# Patient Record
Sex: FEMALE | Race: WHITE | Employment: OTHER | URBAN - METROPOLITAN AREA
[De-identification: names, ages, dates, MRNs, and addresses within clinical notes are randomized per-mention and may not be internally consistent; named-entity substitution may affect disease eponyms.]

---

## 2017-01-16 PROCEDURE — 81015 MICROSCOPIC EXAM OF URINE: CPT | Performed by: UROLOGY

## 2017-11-01 PROBLEM — K63.5 COLON POLYP: Status: ACTIVE | Noted: 2017-11-01

## 2017-12-01 PROCEDURE — 82043 UR ALBUMIN QUANTITATIVE: CPT | Performed by: INTERNAL MEDICINE

## 2017-12-01 PROCEDURE — 82570 ASSAY OF URINE CREATININE: CPT | Performed by: INTERNAL MEDICINE

## 2018-04-05 PROBLEM — E78.00 HYPERCHOLESTEREMIA: Status: ACTIVE | Noted: 2018-04-05

## 2018-07-25 PROCEDURE — 82570 ASSAY OF URINE CREATININE: CPT | Performed by: INTERNAL MEDICINE

## 2018-07-25 PROCEDURE — 82043 UR ALBUMIN QUANTITATIVE: CPT | Performed by: INTERNAL MEDICINE

## 2018-08-06 PROBLEM — H10.31 ACUTE CONJUNCTIVITIS OF RIGHT EYE, UNSPECIFIED ACUTE CONJUNCTIVITIS TYPE: Status: ACTIVE | Noted: 2018-08-06

## 2018-08-06 PROBLEM — B37.0 ORAL THRUSH: Status: ACTIVE | Noted: 2018-08-06

## 2018-08-06 PROBLEM — J20.9 BRONCHITIS WITH BRONCHOSPASM: Status: ACTIVE | Noted: 2018-08-06

## 2018-08-20 PROBLEM — H10.31 ACUTE CONJUNCTIVITIS OF RIGHT EYE, UNSPECIFIED ACUTE CONJUNCTIVITIS TYPE: Status: RESOLVED | Noted: 2018-08-06 | Resolved: 2018-08-20

## 2018-08-20 PROBLEM — B37.0 ORAL THRUSH: Status: RESOLVED | Noted: 2018-08-06 | Resolved: 2018-08-20

## 2018-08-20 PROBLEM — J20.9 BRONCHITIS WITH BRONCHOSPASM: Status: RESOLVED | Noted: 2018-08-06 | Resolved: 2018-08-20

## 2019-02-18 PROCEDURE — 86803 HEPATITIS C AB TEST: CPT | Performed by: INTERNAL MEDICINE

## 2021-05-03 PROBLEM — M25.552 LEFT HIP PAIN: Status: ACTIVE | Noted: 2021-05-03

## 2021-10-26 PROBLEM — M25.552 LEFT HIP PAIN: Status: RESOLVED | Noted: 2021-05-03 | Resolved: 2021-10-26

## 2022-01-26 PROBLEM — L25.9 CONTACT DERMATITIS, UNSPECIFIED CONTACT DERMATITIS TYPE, UNSPECIFIED TRIGGER: Status: ACTIVE | Noted: 2022-01-26

## 2022-02-25 PROBLEM — N32.81 OVERACTIVE BLADDER: Status: ACTIVE | Noted: 2022-02-25

## 2024-04-30 RX ORDER — MELOXICAM 15 MG/1
15 TABLET ORAL DAILY
COMMUNITY

## 2024-04-30 RX ORDER — TIZANIDINE 4 MG/1
4 TABLET ORAL NIGHTLY PRN
COMMUNITY

## 2024-05-25 ENCOUNTER — LAB ENCOUNTER (OUTPATIENT)
Dept: LAB | Age: 69
End: 2024-05-25
Attending: UROLOGY

## 2024-05-25 DIAGNOSIS — Z01.818 PRE-OP TESTING: ICD-10-CM

## 2024-05-25 LAB
ANION GAP SERPL CALC-SCNC: 9 MMOL/L (ref 0–18)
BUN BLD-MCNC: 17 MG/DL (ref 9–23)
CALCIUM BLD-MCNC: 9.3 MG/DL (ref 8.5–10.1)
CHLORIDE SERPL-SCNC: 104 MMOL/L (ref 98–112)
CO2 SERPL-SCNC: 25 MMOL/L (ref 21–32)
CREAT BLD-MCNC: 0.9 MG/DL
EGFRCR SERPLBLD CKD-EPI 2021: 69 ML/MIN/1.73M2 (ref 60–?)
GLUCOSE BLD-MCNC: 108 MG/DL (ref 70–99)
OSMOLALITY SERPL CALC.SUM OF ELEC: 288 MOSM/KG (ref 275–295)
POTASSIUM SERPL-SCNC: 4 MMOL/L (ref 3.5–5.1)
SODIUM SERPL-SCNC: 138 MMOL/L (ref 136–145)

## 2024-05-25 PROCEDURE — 36415 COLL VENOUS BLD VENIPUNCTURE: CPT

## 2024-05-25 PROCEDURE — 80048 BASIC METABOLIC PNL TOTAL CA: CPT

## 2024-05-28 NOTE — PAT NURSING NOTE
Called Dr. Eric's office x2 option 3, on hold over 20 minutes for each call. Need to request stress test report and tracing completed 5/24, s/w Opal SMYTH

## 2024-05-29 ENCOUNTER — ANESTHESIA EVENT (OUTPATIENT)
Dept: SURGERY | Facility: HOSPITAL | Age: 69
End: 2024-05-29
Payer: MEDICARE

## 2024-05-29 ENCOUNTER — APPOINTMENT (OUTPATIENT)
Dept: GENERAL RADIOLOGY | Facility: HOSPITAL | Age: 69
End: 2024-05-29
Attending: UROLOGY
Payer: MEDICARE

## 2024-05-29 ENCOUNTER — HOSPITAL ENCOUNTER (OUTPATIENT)
Dept: INTERVENTIONAL RADIOLOGY/VASCULAR | Facility: HOSPITAL | Age: 69
Discharge: HOME | End: 2024-05-29
Attending: UROLOGY | Admitting: UROLOGY
Payer: MEDICARE

## 2024-05-29 ENCOUNTER — ANESTHESIA (OUTPATIENT)
Dept: SURGERY | Facility: HOSPITAL | Age: 69
End: 2024-05-29
Payer: MEDICARE

## 2024-05-29 ENCOUNTER — HOSPITAL ENCOUNTER (OUTPATIENT)
Dept: INTERVENTIONAL RADIOLOGY/VASCULAR | Facility: HOSPITAL | Age: 69
Discharge: HOME | DRG: 660 | End: 2024-05-29
Attending: UROLOGY | Admitting: UROLOGY
Payer: MEDICARE

## 2024-05-29 ENCOUNTER — APPOINTMENT (OUTPATIENT)
Dept: GENERAL RADIOLOGY | Facility: HOSPITAL | Age: 69
DRG: 660 | End: 2024-05-29
Attending: UROLOGY
Payer: MEDICARE

## 2024-05-29 ENCOUNTER — HOSPITAL ENCOUNTER (INPATIENT)
Facility: HOSPITAL | Age: 69
LOS: 1 days | Discharge: HOME | DRG: 660 | End: 2024-05-29
Attending: UROLOGY | Admitting: UROLOGY
Payer: MEDICARE

## 2024-05-29 ENCOUNTER — HOSPITAL ENCOUNTER (INPATIENT)
Facility: HOSPITAL | Age: 69
LOS: 1 days | Discharge: HOME | End: 2024-05-29
Attending: UROLOGY | Admitting: UROLOGY
Payer: MEDICARE

## 2024-05-29 VITALS
RESPIRATION RATE: 27 BRPM | OXYGEN SATURATION: 98 % | WEIGHT: 254 LBS | DIASTOLIC BLOOD PRESSURE: 61 MMHG | HEART RATE: 84 BPM | BODY MASS INDEX: 45 KG/M2 | SYSTOLIC BLOOD PRESSURE: 107 MMHG | TEMPERATURE: 97 F | HEIGHT: 63 IN

## 2024-05-29 DIAGNOSIS — N20.0 STAGHORN CALCULUS: ICD-10-CM

## 2024-05-29 DIAGNOSIS — Z01.818 PRE-OP TESTING: Primary | ICD-10-CM

## 2024-05-29 LAB
GLUCOSE BLD-MCNC: 113 MG/DL (ref 70–99)
GLUCOSE BLD-MCNC: 97 MG/DL (ref 70–99)
INR BLD: 0.95 (ref 0.8–1.2)
PROTHROMBIN TIME: 12.6 SECONDS (ref 11.6–14.8)

## 2024-05-29 PROCEDURE — 0TC68ZZ EXTIRPATION OF MATTER FROM RIGHT URETER, VIA NATURAL OR ARTIFICIAL OPENING ENDOSCOPIC: ICD-10-PCS | Performed by: UROLOGY

## 2024-05-29 PROCEDURE — 82962 GLUCOSE BLOOD TEST: CPT

## 2024-05-29 PROCEDURE — BT1D1ZZ FLUOROSCOPY OF RIGHT KIDNEY, URETER AND BLADDER USING LOW OSMOLAR CONTRAST: ICD-10-PCS | Performed by: UROLOGY

## 2024-05-29 PROCEDURE — 99153 MOD SED SAME PHYS/QHP EA: CPT | Performed by: RADIOLOGY

## 2024-05-29 PROCEDURE — 76000 FLUOROSCOPY <1 HR PHYS/QHP: CPT | Performed by: UROLOGY

## 2024-05-29 PROCEDURE — 76705 ECHO EXAM OF ABDOMEN: CPT | Performed by: RADIOLOGY

## 2024-05-29 PROCEDURE — 0T768DZ DILATION OF RIGHT URETER WITH INTRALUMINAL DEVICE, VIA NATURAL OR ARTIFICIAL OPENING ENDOSCOPIC: ICD-10-PCS | Performed by: UROLOGY

## 2024-05-29 PROCEDURE — 82365 CALCULUS SPECTROSCOPY: CPT | Performed by: UROLOGY

## 2024-05-29 PROCEDURE — 85610 PROTHROMBIN TIME: CPT

## 2024-05-29 PROCEDURE — 99152 MOD SED SAME PHYS/QHP 5/>YRS: CPT | Performed by: RADIOLOGY

## 2024-05-29 PROCEDURE — 88300 SURGICAL PATH GROSS: CPT | Performed by: UROLOGY

## 2024-05-29 PROCEDURE — 0TJ53ZZ INSPECTION OF KIDNEY, PERCUTANEOUS APPROACH: ICD-10-PCS | Performed by: RADIOLOGY

## 2024-05-29 DEVICE — URETERAL STENT
Type: IMPLANTABLE DEVICE | Site: URETER | Status: FUNCTIONAL
Brand: ASCERTA™

## 2024-05-29 RX ORDER — ONDANSETRON 2 MG/ML
4 INJECTION INTRAMUSCULAR; INTRAVENOUS EVERY 6 HOURS PRN
Status: DISCONTINUED | OUTPATIENT
Start: 2024-05-29 | End: 2024-05-29

## 2024-05-29 RX ORDER — METOCLOPRAMIDE HYDROCHLORIDE 5 MG/ML
10 INJECTION INTRAMUSCULAR; INTRAVENOUS EVERY 8 HOURS PRN
Status: DISCONTINUED | OUTPATIENT
Start: 2024-05-29 | End: 2024-05-29

## 2024-05-29 RX ORDER — ACETAMINOPHEN 500 MG
1000 TABLET ORAL ONCE AS NEEDED
Status: COMPLETED | OUTPATIENT
Start: 2024-05-29 | End: 2024-05-29

## 2024-05-29 RX ORDER — INSULIN ASPART 100 [IU]/ML
INJECTION, SOLUTION INTRAVENOUS; SUBCUTANEOUS ONCE
Status: DISCONTINUED | OUTPATIENT
Start: 2024-05-29 | End: 2024-05-29

## 2024-05-29 RX ORDER — NALOXONE HYDROCHLORIDE 0.4 MG/ML
0.08 INJECTION, SOLUTION INTRAMUSCULAR; INTRAVENOUS; SUBCUTANEOUS AS NEEDED
Status: DISCONTINUED | OUTPATIENT
Start: 2024-05-29 | End: 2024-05-29

## 2024-05-29 RX ORDER — SODIUM CHLORIDE, SODIUM LACTATE, POTASSIUM CHLORIDE, CALCIUM CHLORIDE 600; 310; 30; 20 MG/100ML; MG/100ML; MG/100ML; MG/100ML
INJECTION, SOLUTION INTRAVENOUS CONTINUOUS
Status: DISCONTINUED | OUTPATIENT
Start: 2024-05-29 | End: 2024-05-29

## 2024-05-29 RX ORDER — HYDROCODONE BITARTRATE AND ACETAMINOPHEN 5; 325 MG/1; MG/1
2 TABLET ORAL ONCE AS NEEDED
Status: COMPLETED | OUTPATIENT
Start: 2024-05-29 | End: 2024-05-29

## 2024-05-29 RX ORDER — MIDAZOLAM HYDROCHLORIDE 1 MG/ML
INJECTION INTRAMUSCULAR; INTRAVENOUS
Status: COMPLETED
Start: 2024-05-29 | End: 2024-05-29

## 2024-05-29 RX ORDER — HYDROCODONE BITARTRATE AND ACETAMINOPHEN 5; 325 MG/1; MG/1
1 TABLET ORAL ONCE AS NEEDED
Status: COMPLETED | OUTPATIENT
Start: 2024-05-29 | End: 2024-05-29

## 2024-05-29 RX ORDER — LEVOFLOXACIN 5 MG/ML
INJECTION, SOLUTION INTRAVENOUS
Status: COMPLETED
Start: 2024-05-29 | End: 2024-05-29

## 2024-05-29 RX ORDER — ROCURONIUM BROMIDE 10 MG/ML
INJECTION, SOLUTION INTRAVENOUS AS NEEDED
Status: DISCONTINUED | OUTPATIENT
Start: 2024-05-29 | End: 2024-05-29 | Stop reason: SURG

## 2024-05-29 RX ORDER — HYDROMORPHONE HYDROCHLORIDE 1 MG/ML
0.4 INJECTION, SOLUTION INTRAMUSCULAR; INTRAVENOUS; SUBCUTANEOUS EVERY 5 MIN PRN
Status: DISCONTINUED | OUTPATIENT
Start: 2024-05-29 | End: 2024-05-29

## 2024-05-29 RX ORDER — KETOROLAC TROMETHAMINE 30 MG/ML
INJECTION, SOLUTION INTRAMUSCULAR; INTRAVENOUS AS NEEDED
Status: DISCONTINUED | OUTPATIENT
Start: 2024-05-29 | End: 2024-05-29 | Stop reason: SURG

## 2024-05-29 RX ORDER — LIDOCAINE HYDROCHLORIDE 10 MG/ML
INJECTION, SOLUTION EPIDURAL; INFILTRATION; INTRACAUDAL; PERINEURAL AS NEEDED
Status: DISCONTINUED | OUTPATIENT
Start: 2024-05-29 | End: 2024-05-29 | Stop reason: SURG

## 2024-05-29 RX ORDER — DEXAMETHASONE SODIUM PHOSPHATE 4 MG/ML
VIAL (ML) INJECTION AS NEEDED
Status: DISCONTINUED | OUTPATIENT
Start: 2024-05-29 | End: 2024-05-29 | Stop reason: SURG

## 2024-05-29 RX ORDER — ACETAMINOPHEN 500 MG
1000 TABLET ORAL ONCE
Status: DISCONTINUED | OUTPATIENT
Start: 2024-05-29 | End: 2024-05-29

## 2024-05-29 RX ORDER — HYDROMORPHONE HYDROCHLORIDE 1 MG/ML
0.2 INJECTION, SOLUTION INTRAMUSCULAR; INTRAVENOUS; SUBCUTANEOUS EVERY 5 MIN PRN
Status: DISCONTINUED | OUTPATIENT
Start: 2024-05-29 | End: 2024-05-29

## 2024-05-29 RX ORDER — NICOTINE POLACRILEX 4 MG
30 LOZENGE BUCCAL
Status: DISCONTINUED | OUTPATIENT
Start: 2024-05-29 | End: 2024-05-29

## 2024-05-29 RX ORDER — MIDAZOLAM HYDROCHLORIDE 1 MG/ML
1 INJECTION INTRAMUSCULAR; INTRAVENOUS EVERY 5 MIN PRN
Status: DISCONTINUED | OUTPATIENT
Start: 2024-05-29 | End: 2024-05-29

## 2024-05-29 RX ORDER — LIDOCAINE HYDROCHLORIDE 10 MG/ML
INJECTION, SOLUTION INFILTRATION; PERINEURAL
Status: COMPLETED
Start: 2024-05-29 | End: 2024-05-29

## 2024-05-29 RX ORDER — HYDROMORPHONE HYDROCHLORIDE 1 MG/ML
0.6 INJECTION, SOLUTION INTRAMUSCULAR; INTRAVENOUS; SUBCUTANEOUS EVERY 5 MIN PRN
Status: DISCONTINUED | OUTPATIENT
Start: 2024-05-29 | End: 2024-05-29

## 2024-05-29 RX ORDER — MIDAZOLAM HYDROCHLORIDE 1 MG/ML
INJECTION INTRAMUSCULAR; INTRAVENOUS
Status: DISCONTINUED
Start: 2024-05-29 | End: 2024-05-29 | Stop reason: WASHOUT

## 2024-05-29 RX ORDER — ONDANSETRON 2 MG/ML
INJECTION INTRAMUSCULAR; INTRAVENOUS AS NEEDED
Status: DISCONTINUED | OUTPATIENT
Start: 2024-05-29 | End: 2024-05-29 | Stop reason: SURG

## 2024-05-29 RX ORDER — DIPHENHYDRAMINE HYDROCHLORIDE 50 MG/ML
12.5 INJECTION INTRAMUSCULAR; INTRAVENOUS AS NEEDED
Status: DISCONTINUED | OUTPATIENT
Start: 2024-05-29 | End: 2024-05-29

## 2024-05-29 RX ORDER — HYDRALAZINE HYDROCHLORIDE 20 MG/ML
INJECTION INTRAMUSCULAR; INTRAVENOUS AS NEEDED
Status: DISCONTINUED | OUTPATIENT
Start: 2024-05-29 | End: 2024-05-29 | Stop reason: SURG

## 2024-05-29 RX ORDER — NICOTINE POLACRILEX 4 MG
15 LOZENGE BUCCAL
Status: DISCONTINUED | OUTPATIENT
Start: 2024-05-29 | End: 2024-05-29

## 2024-05-29 RX ORDER — CEPHALEXIN 500 MG/1
500 CAPSULE ORAL 3 TIMES DAILY
Qty: 6 CAPSULE | Refills: 0 | Status: SHIPPED | OUTPATIENT
Start: 2024-05-29 | End: 2024-05-31

## 2024-05-29 RX ORDER — DEXTROSE MONOHYDRATE 25 G/50ML
50 INJECTION, SOLUTION INTRAVENOUS
Status: DISCONTINUED | OUTPATIENT
Start: 2024-05-29 | End: 2024-05-29

## 2024-05-29 RX ADMIN — HYDRALAZINE HYDROCHLORIDE 10 MG: 20 INJECTION INTRAMUSCULAR; INTRAVENOUS at 16:26:00

## 2024-05-29 RX ADMIN — ROCURONIUM BROMIDE 80 MG: 10 INJECTION, SOLUTION INTRAVENOUS at 14:20:00

## 2024-05-29 RX ADMIN — DEXAMETHASONE SODIUM PHOSPHATE 4 MG: 4 MG/ML VIAL (ML) INJECTION at 14:20:00

## 2024-05-29 RX ADMIN — ONDANSETRON 4 MG: 2 INJECTION INTRAMUSCULAR; INTRAVENOUS at 16:34:00

## 2024-05-29 RX ADMIN — ROCURONIUM BROMIDE 10 MG: 10 INJECTION, SOLUTION INTRAVENOUS at 15:14:00

## 2024-05-29 RX ADMIN — LIDOCAINE HYDROCHLORIDE 50 MG: 10 INJECTION, SOLUTION EPIDURAL; INFILTRATION; INTRACAUDAL; PERINEURAL at 14:20:00

## 2024-05-29 RX ADMIN — SODIUM CHLORIDE, SODIUM LACTATE, POTASSIUM CHLORIDE, CALCIUM CHLORIDE: 600; 310; 30; 20 INJECTION, SOLUTION INTRAVENOUS at 14:20:00

## 2024-05-29 RX ADMIN — LEVOFLOXACIN 500 MG: 5 INJECTION, SOLUTION INTRAVENOUS at 10:42:00

## 2024-05-29 RX ADMIN — ROCURONIUM BROMIDE 10 MG: 10 INJECTION, SOLUTION INTRAVENOUS at 16:00:00

## 2024-05-29 RX ADMIN — KETOROLAC TROMETHAMINE 30 MG: 30 INJECTION, SOLUTION INTRAMUSCULAR; INTRAVENOUS at 16:24:00

## 2024-05-29 RX ADMIN — ROCURONIUM BROMIDE 10 MG: 10 INJECTION, SOLUTION INTRAVENOUS at 15:33:00

## 2024-05-29 NOTE — H&P
[facility]  H&P    Luke Giraldo Patient Status:  Inpatient    10/23/1955 MRN [id number]   Location [facility] PERIOPERATIVE SERVICE Attending Crescencio Richards MD   Hosp Day # 0 PCP Pushpa Porter MD     Impression and Plan:  Large right renal calculus.  Unable to gain access for percutaneous nephrolithotripsy.  Will therefore do ureteroscopy.  T  Crescencio Richards MD  2024  1:55 PM      History of Present Illness:  Luke Giraldo presented for right percutaneous nephrolithotripsy but interventional radiology was unable to gain access because of her body habitus.  She has a 2.6 cm right I therefore spoke t to o the patient about another future attempted percutaneous access by another physician possibly using a different technique, versus staged ureteroscopy.  We decided to do ureteroscopy today and if unable to complete the stone treatment she would return for second stage ureteroscopy sometime in the next couple of weeks.  She has not had any fevers.  She is now very comfortable.  We have discussed the various stone procedures and risks in the past.  Allergies:  Allergies   Allergen Reactions    Penicillins RASH     At age 13       Medications:    Outpatient Medications Marked as Taking for the 24 encounter (Hospital Encounter)   Medication Sig Dispense Refill    tiZANidine 4 MG Oral Tab Take 1 tablet (4 mg total) by mouth nightly as needed.      Meloxicam 15 MG Oral Tab Take 1 tablet (15 mg total) by mouth daily.      METFORMIN  MG Oral Tablet 24 Hr TAKE 1 TABLET(500 MG) BY MOUTH DAILY WITH BREAKFAST 90 tablet 0    levothyroxine 75 MCG Oral Tab Take 1 tablet (75 mcg total) by mouth every morning before breakfast. 90 tablet 1    lisinopril 2.5 MG Oral Tab Take 1 tablet (2.5 mg total) by mouth daily. 90 tablet 3    Cholecalciferol (VITAMIN D) 2000 units Oral Cap Take 1 capsule (2,000 Units total) by mouth daily.         Current Facility-Administered Medications   Medication Dose Route  Frequency    acetaminophen (Tylenol Extra Strength) tab 1,000 mg  1,000 mg Oral Once    glucose (Dex4) 15 GM/59ML oral liquid 15 g  15 g Oral Q15 Min PRN    Or    glucose (Glutose) 40% oral gel 15 g  15 g Oral Q15 Min PRN    Or    glucose-vitamin C (Dex-4) chewable tab 4 tablet  4 tablet Oral Q15 Min PRN    Or    dextrose 50% injection 50 mL  50 mL Intravenous Q15 Min PRN    Or    glucose (Dex4) 15 GM/59ML oral liquid 30 g  30 g Oral Q15 Min PRN    Or    glucose (Glutose) 40% oral gel 30 g  30 g Oral Q15 Min PRN    Or    glucose-vitamin C (Dex-4) chewable tab 8 tablet  8 tablet Oral Q15 Min PRN    lactated ringers infusion   Intravenous Continuous    ceFAZolin (Ancef) 2g in 10mL IV syringe premix  2 g Intravenous Once       History:  Past Medical History:    Abnormal electrocardiogram    Cholelithiasis    Cholelithiasis    Colon polyps    Tubular adenoma    Dermatophytosis    Diabetes (HCC)    Disorder of thyroid    Essential hypertension    Essential hypertension    Exomphalos (HCC)    Hernia of anterior abdominal wall    Herpes zoster    High blood pressure    High cholesterol    Hypothyroidism    Impaired glucose tolerance    Localized primary osteoarthritis of wrist    Lymphedema    Obesity    Obstructive sleep apnea (adult) (pediatric)    AHI 36 REM AHI 33 O2 Bharat 77%    Osteoarthritis    Rheumatoid arthritis (HCC)    at age 17 told she had it, at age 48 told that she did not have it    Urolithiasis    Visual impairment    Vitamin D deficiency       Past Surgical History:   Procedure Laterality Date          Colonoscopy  2017    Colonoscopy  2019    Rodolfo Wei    Knee replacement surgery      Other surgical history      Lap Band    Other surgical history  2020    cystoscoopy procedure Dr Gunderson     Total knee replacement Bilateral        Family History   Problem Relation Age of Onset    Diabetes Father     Heart Disorder Father     Hypertension Father     Cancer Mother          Breast and Liver    Breast Cancer Mother 80        80 metastasized passed at 91    Diabetes Maternal Grandmother     Diabetes Paternal Grandmother     Cancer Sister         Breast    Breast Cancer Sister 63        63       Social History     Socioeconomic History    Marital status:      Spouse name: Not on file    Number of children: Not on file    Years of education: Not on file    Highest education level: Not on file   Occupational History    Not on file   Tobacco Use    Smoking status: Former     Current packs/day: 0.00     Average packs/day: 1 pack/day for 10.0 years (10.0 ttl pk-yrs)     Types: Cigarettes     Start date: 10/6/1987     Quit date: 10/6/1997     Years since quittin.6    Smokeless tobacco: Never   Vaping Use    Vaping status: Never Used   Substance and Sexual Activity    Alcohol use: Yes    Drug use: No    Sexual activity: Yes   Other Topics Concern    Not on file   Social History Narrative    Not on file     Social Determinants of Health     Financial Resource Strain: Not on file   Food Insecurity: Not on file   Transportation Needs: Not on file   Physical Activity: Not on file   Stress: Not on file   Social Connections: Not on file   Housing Stability: Not on file       Review of Systems:  No SOB, angina, fevers, focal weakness, and as in HPI.    Physical Exam:  /68   Pulse (!) 47   Temp 97 °F (36.1 °C) (Temporal)   Resp 16   Ht 5' 3\" (1.6 m)   Wt 253 lb 15.5 oz (115.2 kg)   SpO2 93%   BMI 44.99 kg/m²   General: Alert, orientated x3.  Cooperative.  No apparent distress.  HEENT: Exam is unremarkable.   Lungs: Clear to auscultation bilaterally.  Cardiac: Regular rate and rhythm. No murmur.  Abdomen: Obese. Soft, non-distended, non-tender, with no rebound or guarding.  No peritoneal signs. No ascites.  Liver is within normal limits.  Spleen is not palpable.    Genitourinary: No flank tenderness.   Extremities:  No lower extremity edema noted.  Without clubbing or cyanosis.     Skin: Normal texture and turgor.  Neurologic:  Motor strength and sensory examination is grossly normal.  No focal neurologic deficit.    Laboratory Data:       Crescencio Richards M.D.  [facility], Urology   [phone]  @

## 2024-05-29 NOTE — OPERATIVE REPORT
[facility]   OPERATIVE REPORT    Luek Giraldo Patient Status:  Inpatient    10/23/1955 MRN [id number]   Location [facility] POST ANESTHESIA CARE UNIT Attending Crescencio Richards MD   Hosp Day # 0 PCP Pushpa Porter MD     DATE OF OPERATION: 2024     PREOPERATIVE DIAGNOSIS: Right Ureteral Calculus.    POSTOPERATIVE DIAGNOSIS: Same    PROCEDURE PERFORMED:  Right Ureteroscopic Stone extraction with Laser Lithotripsy, Cystoscopy, Retrograde Pyelogram and Placement of Ureteral Stent    SURGEON: Crescencio Richards MD    ANESTHESIA:  General.     INDICATIONS:  Flank pain related to ureteral calculus     FINDINGS:  Large, soft stone.  Fragmented very well.  Prolonged procedure code related to the size of the stone and location.  Procedure time was 2 hours and 5 minutes.  She will schedule an appointment in the office in 3 to 4 weeks for possible cystoscopy and stent removal but will get a CT scan a couple of days prior to that in case she might need a second stage ureteroscopy.  Cephalexin x 2 days for postop prophylaxis.     EBL: None    COMPLICATIONS: None    SPECIMENS: Stone fragments    TECHNIQUE:  A general anesthesia was induced.  A time-out was  reviewed.  Preoperative antibiotics were administered.  The patient  was prepped and draped in the dorsal lithotomy position.  Sequential  compression devices were in place on the lower legs.     The cystoscope was passed into the bladder and the bladder was  examined.  The ureteral orifices were in normal position.  The  cystoscope was used to guide a ureteral catheter into the right  ureteral orifice and water soluble contrast was injected retrograde.  I could faintly see the stone on the plain images and it was outlined by the contrast in the renal pelvis.  The orientation of the calyces suggested slightly laterally oriented lower pole and medially oriented upper pole.     The guidewire was passed and then the obturator from the ureteral sheath was then passed  over a guidewire up into the obturator and 12/14 ureteral sheath were advanced over the guidewire.  The obturator was removed.  The flexible ureteroscope was advanced up to  the level of the stone.  There was a little bit of blood in the renal pelvis related to the attempt to place the percutaneous access  Interventional radiology.  The stone was fragmented with the holmium laser 365 a long time was spent µm fiber.  The stone fragments were then removed  with a basket.  Removing fragments and irrigating out the calyces and reexamining until all fragments being removed were only 1 or 2 mm.  I irrigated out much of the [place] the fragmentation.  A  6-Arabic 24-cm  stent was then passed over a guidewire up into the ureter.  I could not get good cortical of the proximal portion of the stent and eventually removed that and used a 26 cm stent but still could not get it to coil in renal pelvis.  The tip of the stent curled around down into the lower pole calyx and I decided to leave it in that position.  The Dangler's had been removed from the stent.  There was a nice coil in the bladder.   The patient was  awakened, and taken to the recovery area in good condition.     Crescencio Richards M.D.  [facility], Urology  [phone]  5/29/2024  5:01 PM

## 2024-05-29 NOTE — PROCEDURES
[facility]   part of [facility]  Procedure Note    Luke Giraldo Patient Status:  Outpatient    10/23/1955 MRN [id number]   Location [facility] INTERVENTIONAL SUITES Attending Crescencio Richards MD    Day # 0 PCP Pushap Porter MD     Procedure: Right nephrostomy tube placement attempt    Pre-Procedure Diagnosis:  Right renal calculus    Post-Procedure Diagnosis: Same    Anesthesia:  Sedation    Findings:  Unable to gain access to right renal collecting system    Specimens: None    Blood Loss:  < 5 cc    Tourniquet Time: None  Complications:  None  Drains:  None    Secondary Diagnosis:  N/A    Alma Jo MD  2024

## 2024-05-29 NOTE — ANESTHESIA PROCEDURE NOTES
Airway  Date/Time: 5/29/2024 2:23 PM  Urgency: elective      General Information and Staff    Patient location during procedure: OR  Anesthesiologist: Kirk Reinoso MD  Performed: anesthesiologist   Performed by: Kirk Reinoso MD  Authorized by: Kirk Reinoso MD      Indications and Patient Condition  Indications for airway management: anesthesia  Sedation level: deep  Preoxygenated: yes  Patient position: sniffing  Mask difficulty assessment: 1 - vent by mask    Final Airway Details  Final airway type: endotracheal airway      Successful airway: ETT  Cuffed: yes   Successful intubation technique: Video laryngoscopy  Endotracheal tube insertion site: oral  Blade: GlideScope  Blade size: #3  ETT size (mm): 7.0    Placement verified by: capnometry   Cuff volume (mL): 8  Measured from: lips  ETT to lips (cm): 22  Number of attempts at approach: 1    Additional Comments  atraumatic

## 2024-05-29 NOTE — ANESTHESIA PREPROCEDURE EVALUATION
PRE-OP EVALUATION    Patient Name: Luke Giraldo    Admit Diagnosis: STAGHORN CALCULUS    Pre-op Diagnosis: * No pre-op diagnosis entered *    CYSTOSCOPY, RIGHT URETEROSCOPY, BILATERAL RETROGRADE PYELOGRAMS, LASER LITHOTRIPSY, PLACEMENT OF RIGHT URETERAL STENT    Anesthesia Procedure: CYSTOSCOPY, RIGHT URETEROSCOPY, BILATERAL RETROGRADE PYELOGRAMS, LASER LITHOTRIPSY, PLACEMENT OF RIGHT URETERAL STENT (Right: Abdomen)    Surgeons and Role:     * Crescencio Richards MD - Primary    Pre-op vitals reviewed.  Temp: 97 °F (36.1 °C)  Pulse: 53  Resp: 16  BP: 141/67  SpO2: 100 %  Body mass index is 44.99 kg/m².    Current medications reviewed.  Hospital Medications:   acetaminophen (Tylenol Extra Strength) tab 1,000 mg  1,000 mg Oral Once    glucose (Dex4) 15 GM/59ML oral liquid 15 g  15 g Oral Q15 Min PRN    Or    glucose (Glutose) 40% oral gel 15 g  15 g Oral Q15 Min PRN    Or    glucose-vitamin C (Dex-4) chewable tab 4 tablet  4 tablet Oral Q15 Min PRN    Or    dextrose 50% injection 50 mL  50 mL Intravenous Q15 Min PRN    Or    glucose (Dex4) 15 GM/59ML oral liquid 30 g  30 g Oral Q15 Min PRN    Or    glucose (Glutose) 40% oral gel 30 g  30 g Oral Q15 Min PRN    Or    glucose-vitamin C (Dex-4) chewable tab 8 tablet  8 tablet Oral Q15 Min PRN    lactated ringers infusion   Intravenous Continuous    [COMPLETED] ceFAZolin (Ancef) 2g in 10mL IV syringe premix  2 g Intravenous Once       Outpatient Medications:     Medications Prior to Admission   Medication Sig Dispense Refill Last Dose    tiZANidine 4 MG Oral Tab Take 1 tablet (4 mg total) by mouth nightly as needed.   5/27/2024    Meloxicam 15 MG Oral Tab Take 1 tablet (15 mg total) by mouth daily.   5/15/2024    METFORMIN  MG Oral Tablet 24 Hr TAKE 1 TABLET(500 MG) BY MOUTH DAILY WITH BREAKFAST 90 tablet 0 5/28/2024 at 0800    levothyroxine 75 MCG Oral Tab Take 1 tablet (75 mcg total) by mouth every morning before breakfast. 90 tablet 1 5/28/2024 at 0800    lisinopril  2.5 MG Oral Tab Take 1 tablet (2.5 mg total) by mouth daily. 90 tablet 3 2024 at 0800    Cholecalciferol (VITAMIN D) 2000 units Oral Cap Take 1 capsule (2,000 Units total) by mouth daily.   5/15/2024    Glucose Blood (ACCU-CHEK THERESA PLUS) In Vitro Strip Test blood sugar once daily and as needed 100 strip 3     ACCU-CHEK MULTICLIX LANCETS Does not apply Misc Test once to twice daily 102 each 5        Allergies: Penicillins      Anesthesia Evaluation    Patient summary reviewed.    Anesthetic Complications  (-) history of anesthetic complications         GI/Hepatic/Renal                                 Cardiovascular                (+) obesity  (+) hypertension   (+) hyperlipidemia                                  Endo/Other      (+) diabetes                     (+) arthritis       Pulmonary                    (+) sleep apnea       Neuro/Psych                                      Past Surgical History:   Procedure Laterality Date          Colonoscopy  2017    Colonoscopy  2019    [place] Dr. Wei    Knee replacement surgery      Other surgical history      Lap Band    Other surgical history  2020    cystoscoopy procedure Dr Gunderson     Total knee replacement Bilateral      Social History     Socioeconomic History    Marital status:    Tobacco Use    Smoking status: Former     Current packs/day: 0.00     Average packs/day: 1 pack/day for 10.0 years (10.0 ttl pk-yrs)     Types: Cigarettes     Start date: 10/6/1987     Quit date: 10/6/1997     Years since quittin.6    Smokeless tobacco: Never   Vaping Use    Vaping status: Never Used   Substance and Sexual Activity    Alcohol use: Yes    Drug use: No    Sexual activity: Yes     History   Drug Use No     Available pre-op labs reviewed.     Lab Results   Component Value Date     2024    K 4.0 2024     2024    CO2 25.0 2024    BUN 17 2024    CREATSERUM 0.90 2024     (H)  05/25/2024    CA 9.3 05/25/2024     Lab Results   Component Value Date    INR 0.95 05/29/2024         Airway      Mallampati: III  Mouth opening: 3 FB  TM distance: 4 - 6 cm   Cardiovascular             Dental      Dental appliance(s): veneers       Pulmonary                     Other findings              ASA: 3   Plan: general  NPO status verified and     Post-procedure pain management plan discussed with surgeon and patient.    Comment: GETA/LMA discussed in detail.  Risk of complications discussed including but not limited to sore throat, cough, PONV discussed. Also, discussed risks including dental injury particularly on any weakened, treated or diseased teeth & pt wishes to proceed  All questions answered.    Plan/risks discussed with: patient                Present on Admission:  **None**

## 2024-05-29 NOTE — ANESTHESIA POSTPROCEDURE EVALUATION
[facility]    Luke Giraldo Patient Status:  Inpatient   Age/Gender 68 year old female MRN [id number]   Location [facility] POST ANESTHESIA CARE UNIT Attending Crescencio Richards MD   Hosp Day # 0 PCP Pushpa Porter MD       Anesthesia Post-op Note    CYSTOSCOPY, RIGHT URETEROSCOPY, right RETROGRADE PYELOGRAMS, LASER LITHOTRIPSY, PLACEMENT OF RIGHT URETERAL STENT    Procedure Summary       Date: 05/29/24 Room / Location:  MAIN OR 14 /  MAIN OR    Anesthesia Start: 1415 Anesthesia Stop: 1656    Procedure: CYSTOSCOPY, RIGHT URETEROSCOPY, right RETROGRADE PYELOGRAMS, LASER LITHOTRIPSY, PLACEMENT OF RIGHT URETERAL STENT (Right: Urethra) Diagnosis: (STAGHORN CALCULUS)    Surgeons: Crescencio Richards MD Anesthesiologist: Kirk Reinoso MD    Anesthesia Type: general ASA Status: 3            Anesthesia Type: general    Vitals Value Taken Time   BP 98/45 05/29/24 1656   Temp 97 05/29/24 1656   Pulse 85 05/29/24 1656   Resp 19 05/29/24 1656   SpO2 99 05/29/24 1656       Patient Location: PACU    Anesthesia Type: general    Airway Patency: patent    Postop Pain Control: adequate    Mental Status: mildly sedated but able to meaningfully participate in the post-anesthesia evaluation    Nausea/Vomiting: none    Cardiopulmonary/Hydration status: stable euvolemic    Complications: no apparent anesthesia related complications    Postop vital signs: stable    Dental Exam: Unchanged from Preop    Patient to be discharged from PACU when criteria met.

## 2024-05-29 NOTE — DISCHARGE INSTRUCTIONS
Home Care Instructions Following Your Cystoscopy     Luke-  We hope you were pleased with your care at [facility].  We wish you the best outcome and overall experience with your operation.  These instructions will help to minimize pain, prevent an infection, and improve the likelihood of a successful result.    You Should Expect:  Bloody urine for one week  Burning with urination for one week      Over-The-Counter Medication:  Azo (phenazopyridine hydrochloride 97.5 mg) can help to alleviate burning with urination and bladder spasms  Take 2 tablets 3 times daily with meals as needed  Take with a full glass of water  You can also take OTC Tylenol and/or ibuprofen as needed for pain.  Take medication as directed on the bottle    Home Medication  Resume your home medications as instructed    Bathing/Showers  You can resume baths, showering, swimming, and hot tubs tomorrow    Diet  Resume your normal diet    Activity  No strenuous activity or heavy lifting for one month, if you had a biopsy or tissue was removed.  Refrain from physical exercise or gym workouts for one month, if you had a biopsy or tissue removed.  If you did not have a biopsy or tissue removed, you can resume normal activity as tolerated.  You can go up and down the stairs as tolerated.  Use common sense  You cannot return to work, if your work requires strenuous activity.    Driving  Do not drive, if you are taking pain medication.    Return to Work or School  You can return to work tomorrow, if your work does not involve strenuous activity.  Contact Dr. Richards's office, if you need a medical note.     Follow-up Appointment with Dr. Lyon  Call Dr. Richards's office tomorrow for an appointment as discussed .    Verify the appointment date, time, and office location when you call.  Dr. Richards will assess your progress and discuss any additional concerns.    Questions or Concerns  Call Dr. Richards immediately if you have any of the following:   Chills or  fever above 100.4°F (38°C)   Increased spasms (uncontrollable twitching) in your legs, abdomen, or bladder (occasional mild spasms are normal)   Nausea and vomiting  Large blood clots in your urine   Unable to urinate or having difficulty with urination   If your call is made after office hours, a physician's assistant or nurse practitioner will be available to help you.  There is always a surgeon covering Dr. Richards's patients, if he is unavailable.    Luke  Thank you for coming to [facility] for your operation.  The nurses and the anesthesiologist try very hard to make sure you receive the best care possible.  Your trust in them is greatly appreciated.    Thanks so much,  Dr. Maurice Gonzalse was given to you at: 6:10 pm  Next dose due Tylenol: 12:00 am  Take this medication as directed    You received a drug called Toradol which is an Anti Inflammatory at: 4:20PM  If you are allowed to take Anti inflammatories (like Motrin, Aleve or Ibuprofen, Advil), do not take for six hours after Toradol dose.  Please report any suspected allergic reactions or bleeding issues to your doctor

## 2024-05-30 NOTE — CDS QUERY
CLINICAL DOCUMENTATION CLARIFICATION FORM    Dear Dr. Richards:  Clinical information (provided below) includes a BMI of 44.99 kg/m². Can you please further clarify a diagnosis associated with these findings such as:    [  ] BMI 44.99 kg/m²with Morbid Obesity   [  ] Other (please specify) ____________      DOCUMENTATION FROM THE MEDICAL RECORD  Clinical Indicators/Risk:  ___ BMI 44.99 kg/m²  ___Weight: 115 kg Height: 5'3”  ___5/29 Urology H&P: presented for right percutaneous nephrolithotripsy but interventional radiology was unable to gain access because of her body habitus. She has a 2.6 cm right. I therefore spoke to o the patient about another future attempted percutaneous access by another physician possibly using a different technique, versus staged ureteroscopy.   decided to do ureteroscopy today and if unable to complete the stone treatment she would return for second stage ureteroscopy sometime in the next couple of weeks.   Treatment: Right Ureteroscopic Stone extraction with Laser Lithotripsy, Cystoscopy, Retrograde Pyelogram and Placement of Ureteral Stent.                For questions regarding this query, please contact Clinical  Tanja Orozco RN [phone]. [email or web address] Thank you.  THIS FORM IS A PERMANENT PART OF THE MEDICAL RECORD

## 2024-06-05 NOTE — CDS QUERY
CLINICAL DOCUMENTATION CLARIFICATION FORM    Dear Dr. Richards:  Clinical information (provided below) includes a BMI of 44.99 kg/m². Can you please further clarify a diagnosis associated with these findings such as:    [ x ] BMI 44.99 kg/m²with Morbid Obesity   [  ] Other (please specify) ____________      DOCUMENTATION FROM THE MEDICAL RECORD  Clinical Indicators/Risk:  ___ BMI 44.99 kg/m²  ___Weight: 115 kg Height: 5'3”  ___5/29 Urology H&P: presented for right percutaneous nephrolithotripsy but interventional radiology was unable to gain access because of her body habitus. She has a 2.6 cm right. I therefore spoke to o the patient about another future attempted percutaneous access by another physician possibly using a different technique, versus staged ureteroscopy.   decided to do ureteroscopy today and if unable to complete the stone treatment she would return for second stage ureteroscopy sometime in the next couple of weeks.   Treatment: Right Ureteroscopic Stone extraction with Laser Lithotripsy, Cystoscopy, Retrograde Pyelogram and Placement of Ureteral Stent.                For questions regarding this query, please contact Clinical  Tanja Orozco RN [phone]. [email or web address] Thank you.  THIS FORM IS A PERMANENT PART OF THE MEDICAL RECORD

## 2024-06-07 LAB
CAOX DIHYDRATE: 40 %
CAOX MONOHYDRATE: 60 %
WEIGHT-STONE: 140 MG

## (undated) DEVICE — GUIDEWIRE URO L150CM DIA0.035IN TAPR 3CM NIT

## (undated) DEVICE — PERCUTANEUOS NEPHROLOGY CDS: Brand: MEDLINE INDUSTRIES, INC.

## (undated) DEVICE — URETERAL ACCESS SHEATH SET: Brand: NAVIGATOR HD

## (undated) DEVICE — Device

## (undated) DEVICE — SOLUTION IRRIG 3000ML 0.9% NACL FLX CONT

## (undated) DEVICE — ZIPWIRE GUIDEWIRE .035X150 STR

## (undated) DEVICE — CATHETER URET 5FR L70CM FLX OPN TIP NONPORTED

## (undated) DEVICE — NITINOL STONE RETRIEVAL BASKET: Brand: ZERO TIP

## (undated) DEVICE — 3M™ MICROPORE™ TAPE, 1530-2: Brand: 3M™ MICROPORE™

## (undated) DEVICE — SINGLE-USE DIGITAL FLEXIBLE URETEROSCOPE: Brand: LITHOVUE

## (undated) DEVICE — KIT LITHO PRB 3.9X440MM W/ STONE CTCH

## (undated) DEVICE — DUAL LUMEN URETERAL CATHETER

## (undated) DEVICE — FIBER LSR 365 MIC 365 DL DISP FOR HOLM LSR

## (undated) DEVICE — GLOVE SUR 7.5 SENSICARE PI PIP CRM PWD F

## (undated) DEVICE — ADAPTER BX SEAL PRT ADJ FOR HYSTEROSCOPE

## (undated) DEVICE — CONTAINER,SPECIMEN,PNEU TUBE,4OZ,OR STRL: Brand: MEDLINE

## (undated) DEVICE — SOLUTION IRRIG 1000ML 0.9% NACL USP BTL

## (undated) DEVICE — MEDI-VAC NON-CONDUCTIVE SUCTION TUBING: Brand: CARDINAL HEALTH

## (undated) DEVICE — PAD,ABDOMINAL,8"X7.5",ST,LF,20/BX: Brand: MEDLINE INDUSTRIES, INC.

## (undated) DEVICE — SPONGE GZ 4X4IN COT 12 PLY TYP VII WVN C

## (undated) DEVICE — URETERAL STENT
Type: IMPLANTABLE DEVICE | Site: URETER | Status: NON-FUNCTIONAL
Brand: ASCERTA™
Removed: 2024-05-29

## (undated) DEVICE — SLEEVE COMPR MD KNEE LEN SGL USE KENDALL SCD

## (undated) DEVICE — SYRINGE MED 30ML STD CLR PLAS LL TIP N CTRL

## (undated) NOTE — LETTER
OUTSIDE TESTING RESULT REQUEST     IMPORTANT: FOR YOUR IMMEDIATE ATTENTION  Please FAX all test results listed below to: [phone]     Testing already done on or about:      * * * * If testing is NOT complete, arrange with patient A.S.A.P. * * * *      Patient Name: Luke Giraldo  Surgery Date: 2024  Medical Record: [id number]  CSN: [id number]  : 10/23/1955 - A: 68 y     Sex: female  Surgeon(s):  Crescencio Richards MD  Procedure: RIGHT PERCUTANEOUS NEPHROLITHOTOMY  Anesthesia Type: General     Surgeon: Crescencio Richards MD     The following Testing and Time Line are REQUIRED PER ANESTHESIA     PT/INR within  30 days      Thank You,   Sent by: Glo SMYTH

## (undated) NOTE — LETTER
[address]  [address]  Consent for Procedure/Sedation  Date: 5/29/24         Time: 1000    I hereby authorize Dr Jo, my physician and his/her assistants (if applicable), which may include medical students, residents, and/or fellows, to perform the following surgical operation/ procedure and administer such anesthesia as may be determined necessary by my physician: Preop Right Nephrostomy Tube or Wire Placement on Luke Bahn  2.   I recognize that during the surgical operation/procedure, unforeseen conditions may necessitate additional or different procedures than those listed above.  I, therefore, further authorize and request that the above-named surgeon, assistants, or designees perform such procedures as are, in their judgment, necessary and desirable.    3.   My surgeon/physician has discussed prior to my surgery the potential benefits, risks and side effects of this procedure; the likelihood of achieving goals; and potential problems that might occur during recuperation.  They also discussed reasonable alternatives to the procedure, including risks, benefits, and side effects related to the alternatives and risks related to not receiving this procedure.  I have had all my questions answered and I acknowledge that no guarantee has been made as to the result that may be obtained.    4.   Should the need arise during my operation/procedure, which includes change of level of care prior to discharge, I also consent to the administration of blood and/or blood products.  Further, I understand that despite careful testing and screening of blood or blood products by collecting agencies, I may still be subject to ill effects as a result of receiving a blood transfusion and/or blood products.  The following are some, but not all, of the potential risks that can occur: fever and allergic reactions, hemolytic reactions, transmission of diseases such as Hepatitis, AIDS and  Cytomegalovirus (CMV) and fluid overload.  In the event that I wish to have an autologous transfusion of my own blood, or a directed donor transfusion, I will discuss this with my physician.   Check only if Refusing Blood or Blood Products  I understand refusal of blood or blood products as deemed necessary by my physician may have serious consequences to my condition to include possible death. I hereby assume responsibility for my refusal and release the hospital, its personnel, and my physicians from any responsibility for the consequences of my refusal.         o  Refuse         5.   I authorize the use of any specimen, organs, tissues, body parts or foreign objects that may be removed from my body during the operation/procedure for diagnosis, research or teaching purposes and their subsequent disposal by hospital authorities.  I also authorize the release of specimen test results and/or written reports to my treating physician on the hospital medical staff or other referring or consulting physicians involved in my care, at the discretion of the Pathologist or my treating physician.    6.   I consent to the photographing or videotaping of the operations or procedures to be performed, including appropriate portions of my body for medical, scientific, or educational purposes, provided my identity is not revealed by the pictures or by descriptive texts accompanying them.  If the procedure has been photographed/videotaped, the surgeon will obtain the original picture, image, videotape or CD.  The hospital will not be responsible for storage, release or maintenance of the picture, image, tape or CD.    7.   I consent to the presence of a  or observers in the operating room as deemed necessary by my physician or their designees.    8.   I recognize that in the event my procedure results in extended X-Ray/fluoroscopy time, I may develop a skin reaction.    9. If I have a Do Not Attempt Resuscitation  (DNAR) order in place, that status will be suspended while in the operating room, procedural suite, and during the recovery period unless otherwise explicitly stated by me (or a person authorized to consent on my behalf). The surgeon or my attending physician will determine when the applicable recovery period ends for purposes of reinstating the DNAR order.  10. Patients having a sterilization procedure: I understand that if the procedure is successful the results will be permanent and it will therefore be impossible for me to inseminate, conceive, or bear children.  I also understand that the procedure is intended to result in sterility, although the result has not been guaranteed.   11. I acknowledge that my physician has explained sedation/analgesia administration to me including the risk and benefits I consent to the administration of sedation/analgesia as may be necessary or desirable in the judgment of my physician.    I CERTIFY THAT I HAVE READ AND FULLY UNDERSTAND THE ABOVE CONSENT TO OPERATION and/or OTHER PROCEDURE.        ____________________________________       _________________________________      ______________________________  Signature of Patient         Signature of Responsible Person        Printed Name of Responsible Person        ____________________________________      _________________________________      ______________________________       Signature of Witness          Relationship to Patient                       Date                                       Time  Patient Name: Luke HAWKINS Kristal  : 10/23/1955    Reviewed: 2024   Printed: May 29, 2024  Medical Record #: [id number] Page 1 of 1

## (undated) NOTE — LETTER
OUTSIDE TESTING RESULT REQUEST     IMPORTANT: FOR YOUR IMMEDIATE ATTENTION  Please FAX all test results listed below to: [phone]     Testing already done on or about: 24      * * * * If testing is NOT complete, arrange with patient A.S.A.P. * * * *      Patient Name: Luek Giraldo  Surgery Date: 2024  Medical Record: [id number]  CSN: [id number]  : 10/23/1955 - A: 68 y     Sex: female  Surgeon(s):  Crescencio Richadrs MD  Procedure: RIGHT PERCUTANEOUS NEPHROLITHOTOMY  Anesthesia Type: General     Surgeon: Crescencio Richards MD     The following Testing and Time Line are REQUIRED PER ANESTHESIA     EKG READ AND SIGNED WITHIN   90 days      Thank You,   Sent by:ABDOULAYE

## (undated) NOTE — LETTER
[address]  [address]  Authorization for Surgical Operation and Procedure     Date:___________                                                                                                         Time:__________  I hereby authorize Surgeon(s):  Crescencio Richards MD, my physician and his/her assistants (if applicable), which may include medical students, residents, and/or fellows, to perform the following surgical operation/ procedure and administer such anesthesia as may be determined necessary by my physician:  Operation/Procedure name (s) Procedure(s):  CYSTOSCOPY, RIGHT URETEROSCOPY, BILATERAL RETROGRADE PYELOGRAMS, LASER LITHOTRIPSY, PLACEMENT OF RIGHT URETERAL STENT on Luke Giraldo   2.   I recognize that during the surgical operation/procedure, unforeseen conditions may necessitate additional or different procedures than those listed above.  I, therefore, further authorize and request that the above-named surgeon, assistants, or designees perform such procedures as are, in their judgment, necessary and desirable.    3.   My surgeon/physician has discussed prior to my surgery the potential benefits, risks and side effects of this procedure; the likelihood of achieving goals; and potential problems that might occur during recuperation.  They also discussed reasonable alternatives to the procedure, including risks, benefits, and side effects related to the alternatives and risks related to not receiving this procedure.  I have had all my questions answered and I acknowledge that no guarantee has been made as to the result that may be obtained.    4.   Should the need arise during my operation/procedure, which includes change of level of care prior to discharge, I also consent to the administration of blood and/or blood products.  Further, I understand that despite careful testing and screening of blood or blood products by collecting agencies, I may still be subject to  ill effects as a result of receiving a blood transfusion and/or blood products.  The following are some, but not all, of the potential risks that can occur: fever and allergic reactions, hemolytic reactions, transmission of diseases such as Hepatitis, AIDS and Cytomegalovirus (CMV) and fluid overload.  In the event that I wish to have an autologous transfusion of my own blood, or a directed donor transfusion, I will discuss this with my physician.  Check only if Refusing Blood or Blood Products  I understand refusal of blood or blood products as deemed necessary by my physician may have serious consequences to my condition to include possible death. I hereby assume responsibility for my refusal and release the hospital, its personnel, and my physicians from any responsibility for the consequences of my refusal.          o  Refuse      5.   I authorize the use of any specimen, organs, tissues, body parts or foreign objects that may be removed from my body during the operation/procedure for diagnosis, research or teaching purposes and their subsequent disposal by hospital authorities.  I also authorize the release of specimen test results and/or written reports to my treating physician on the hospital medical staff or other referring or consulting physicians involved in my care, at the discretion of the Pathologist or my treating physician.    6.   I consent to the photographing or videotaping of the operations or procedures to be performed, including appropriate portions of my body for medical, scientific, or educational purposes, provided my identity is not revealed by the pictures or by descriptive texts accompanying them.  If the procedure has been photographed/videotaped, the surgeon will obtain the original picture, image, videotape or CD.  The hospital will not be responsible for storage, release or maintenance of the picture, image, tape or CD.    7.   I consent to the presence of a  or  observers in the operating room as deemed necessary by my physician or their designees.    8.   I recognize that in the event my procedure results in extended X-Ray/fluoroscopy time, I may develop a skin reaction.    9. If I have a Do Not Attempt Resuscitation (DNAR) order in place, that status will be suspended while in the operating room, procedural suite, and during the recovery period unless otherwise explicitly stated by me (or a person authorized to consent on my behalf). The surgeon or my attending physician will determine when the applicable recovery period ends for purposes of reinstating the DNAR order.  10. Patients having a sterilization procedure: I understand that if the procedure is successful the results will be permanent and it will therefore be impossible for me to inseminate, conceive, or bear children.  I also understand that the procedure is intended to result in sterility, although the result has not been guaranteed.   11. I acknowledge that my physician has explained sedation/analgesia administration to me including the risk and benefits I consent to the administration of sedation/analgesia as may be necessary or desirable in the judgment of my physician.    I CERTIFY THAT I HAVE READ AND FULLY UNDERSTAND THE ABOVE CONSENT TO OPERATION and/or OTHER PROCEDURE.    _________________________________________  __________________________________  Signature of Patient     Signature of Responsible Person         ___________________________________         Printed Name of Responsible Person           _________________________________                 Relationship to Patient  _________________________________________  ______________________________  Signature of Witness          Date  Time      Patient Name: Luke HAWKINS Kristal     : 10/23/1955                 Printed: May 29, 2024     Medical Record #: [id number]                     Page 1 of 2                                    [address]  [place]  [address]    Consent for Anesthesia    I, Luke Giraldo agree to be cared for by an anesthesiologist, who is specially trained to monitor me and give me medicine to put me to sleep or keep me comfortable during my procedure    I understand that my anesthesiologist is not an employee or agent of [facility] or [employer]. He or she works for [employer].    As the patient asking for anesthesia services, I agree to:  Allow the anesthesiologist (anesthesia doctor) to give me medicine and do additional procedures as necessary. Some examples are: Starting or using an “IV” to give me medicine, fluids or blood during my procedure, and having a breathing tube placed to help me breathe when I’m asleep (intubation). In the event that my heart stops working properly, I understand that my anesthesiologist will make every effort to sustain my life, unless otherwise directed by [facility] Do Not Resuscitate documents.  Tell my anesthesia doctor before my procedure:  If I am pregnant.  The last time that I ate or drank.  All of the medicines I take (including prescriptions, herbal supplements, and pills I can buy without a prescription (including street drugs/illegal medications). Failure to inform my anesthesiologist about these medicines may increase my risk of anesthetic complications.  If I am allergic to anything or have had a reaction to anesthesia before.  I understand how the anesthesia medicine will help me (benefits).  I understand that with any type of anesthesia medicine there are risks:  The most common risks are: nausea, vomiting, sore throat, muscle soreness, damage to my eyes, mouth, or teeth (from breathing tube placement).  Rare risks include: remembering what happened during my procedure, allergic reactions to medications, injury to my airway, heart, lungs, vision, nerves, or muscles and in extremely rare instances death.  My doctor has  explained to me other choices available to me for my care (alternatives).  Pregnant Patients (“epidural”):  I understand that the risks of having an epidural (medicine given into my back to help control pain during labor), include itching, low blood pressure, difficulty urinating, headache or slowing of the baby’s heart. Very rare risks include infection, bleeding, seizure, irregular heart rhythms and nerve injury.  Regional Anesthesia (“spinal”, “epidural”, & “nerve blocks”):  I understand that rare but potential complications include headache, bleeding, infection, seizure, irregular heart rhythms, and nerve injury.    I can change my mind about having anesthesia services at any time before I get the medicine.    _____________________________________________________________________________  Patient (or Representative) Signature/Relationship to Patient  Date   Time    _____________________________________________________________________________   Name (if used)    Language/Organization   Time    _____________________________________________________________________________  Anesthesiologist Signature     Date   Time  I have discussed the procedure and information above with the patient (or patient’s representative) and answered their questions. The patient or their representative has agreed to have anesthesia services.    _____________________________________________________________________________  Witness        Date   Time  I have verified that the signature is that of the patient or patient’s representative, and that it was signed before the procedure  Patient Name: Luke Giraldo     : 10/23/1955                 Printed: May 29, 2024     Medical Record #: [id number]                     Page 2 of 2

## (undated) NOTE — LETTER
OUTSIDE TESTING RESULT REQUEST     IMPORTANT: FOR YOUR IMMEDIATE ATTENTION  Please FAX all test results listed below to: [phone]     Testing already done on or about: 24 Nicolasa BRIDGER Zari [place]era      * * * * If testing is NOT complete, arrange with patient A.S.A.P. * * * *      Patient Name: Luke Giraldo  Surgery Date: 2024  Medical Record: [id number]  CSN: [id number]  : 10/23/1955 - A: 68 y     Sex: female  Surgeon(s):  Crescencio Richards MD  Procedure: RIGHT PERCUTANEOUS NEPHROLITHOTOMY  Anesthesia Type: General     Surgeon: Crescencio Richards MD     The following Testing and Time Line are REQUIRED PER ANESTHESIA     STRESS TEST TRACING & REPORT DATED 24      Thank You,   Sent by:Kiara Hanks RN

## (undated) NOTE — LETTER
OUTSIDE TESTING RESULT REQUEST     IMPORTANT: FOR YOUR IMMEDIATE ATTENTION  Please FAX all test results listed below to: [phone]       * * * * If testing is NOT complete, arrange with patient A.S.A.P. * * * *      Patient Name: Luke Giraldo  Surgery Date: 2024  Medical Record: [id number]  CSN: [id number]  : 10/23/1955 - A: 68 y     Sex: female  Surgeon(s):  Crescencio Richards MD  Procedure: RIGHT PERCUTANEOUS NEPHROLITHOTOMY  Anesthesia Type: General     Surgeon: Crescencio Richards MD     The following Testing and Time Line are REQUIRED PER ANESTHESIA     EKG READ AND SIGNED WITHIN   90 days  CBC [with Differential & Platelets] within  30 days  BMP (requires 4 hour fast) within  90 days  PT/INR within  30 days      Thank You,   Sent by: STACIA STANFORD